# Patient Record
Sex: FEMALE | Race: WHITE | NOT HISPANIC OR LATINO
[De-identification: names, ages, dates, MRNs, and addresses within clinical notes are randomized per-mention and may not be internally consistent; named-entity substitution may affect disease eponyms.]

---

## 2017-10-03 ENCOUNTER — RX RENEWAL (OUTPATIENT)
Age: 47
End: 2017-10-03

## 2017-12-27 ENCOUNTER — APPOINTMENT (OUTPATIENT)
Dept: OBGYN | Facility: CLINIC | Age: 47
End: 2017-12-27
Payer: COMMERCIAL

## 2017-12-27 VITALS
WEIGHT: 156 LBS | BODY MASS INDEX: 26.63 KG/M2 | DIASTOLIC BLOOD PRESSURE: 82 MMHG | HEIGHT: 64 IN | SYSTOLIC BLOOD PRESSURE: 124 MMHG

## 2017-12-27 PROCEDURE — 99396 PREV VISIT EST AGE 40-64: CPT

## 2018-12-28 ENCOUNTER — APPOINTMENT (OUTPATIENT)
Dept: OBGYN | Facility: CLINIC | Age: 48
End: 2018-12-28
Payer: COMMERCIAL

## 2018-12-28 VITALS
DIASTOLIC BLOOD PRESSURE: 72 MMHG | WEIGHT: 156 LBS | BODY MASS INDEX: 26.63 KG/M2 | SYSTOLIC BLOOD PRESSURE: 108 MMHG | HEIGHT: 64 IN

## 2018-12-28 DIAGNOSIS — R32 UNSPECIFIED URINARY INCONTINENCE: ICD-10-CM

## 2018-12-28 PROCEDURE — 99396 PREV VISIT EST AGE 40-64: CPT

## 2018-12-28 RX ORDER — NORETHINDRONE ACETATE AND ETHINYL ESTRADIOL 1.5; 3 MG/1; UG/1
1.5-3 TABLET ORAL DAILY
Qty: 21 | Refills: 11 | Status: DISCONTINUED | COMMUNITY
Start: 2017-12-27 | End: 2018-12-28

## 2019-03-14 ENCOUNTER — APPOINTMENT (OUTPATIENT)
Dept: UROGYNECOLOGY | Facility: CLINIC | Age: 49
End: 2019-03-14
Payer: COMMERCIAL

## 2019-03-14 VITALS
BODY MASS INDEX: 25.61 KG/M2 | DIASTOLIC BLOOD PRESSURE: 67 MMHG | HEIGHT: 64 IN | WEIGHT: 150 LBS | SYSTOLIC BLOOD PRESSURE: 102 MMHG

## 2019-03-14 DIAGNOSIS — Z78.9 OTHER SPECIFIED HEALTH STATUS: ICD-10-CM

## 2019-03-14 DIAGNOSIS — Z82.49 FAMILY HISTORY OF ISCHEMIC HEART DISEASE AND OTHER DISEASES OF THE CIRCULATORY SYSTEM: ICD-10-CM

## 2019-03-14 LAB
BILIRUB UR QL STRIP: NORMAL
CLARITY UR: CLEAR
COLLECTION METHOD: NORMAL
GLUCOSE UR-MCNC: NEGATIVE
HCG UR QL: 1 EU/DL
HGB UR QL STRIP.AUTO: NORMAL
KETONES UR-MCNC: NEGATIVE
LEUKOCYTE ESTERASE UR QL STRIP: NEGATIVE
NITRITE UR QL STRIP: NEGATIVE
PH UR STRIP: 7.5
PROT UR STRIP-MCNC: NEGATIVE
SP GR UR STRIP: 1.01

## 2019-03-14 PROCEDURE — 99244 OFF/OP CNSLTJ NEW/EST MOD 40: CPT

## 2019-03-14 PROCEDURE — 81003 URINALYSIS AUTO W/O SCOPE: CPT | Mod: QW

## 2019-03-14 RX ORDER — SIMVASTATIN 80 MG/1
TABLET, FILM COATED ORAL
Refills: 0 | Status: ACTIVE | COMMUNITY

## 2019-03-14 NOTE — OB HISTORY
[Vaginal ___] : [unfilled] vaginal delivery(s) [Definite ___ (Date)] : the last menstrual period was [unfilled] [Regular Cycle Intervals] : periods have been regular [Last Pap Smear ___] : date of last pap smear was on [unfilled] [Sexually Active] : sexually active [Very Satisfied] : very satisfied

## 2019-03-14 NOTE — HISTORY OF PRESENT ILLNESS
[Cystocele (Obstetric)] : none [Rectal Prolapse] : none [Unable To Restrain Bowel Movement] : frequent [Urinary Frequency More Than Twice At Night (Nocturia)] : none [Urinary Frequency] : none [Feelings Of Urinary Urgency] : none [Pain During Urination (Dysuria)] : none [Urinary Tract Infection] : rare [Constipation Obstructed Defecation] : rare [Stool Visible Blood] : none [Pelvic Pain] : none [Vaginal Pain] : none [] : none [FreeTextEntry1] : \par 47 y/o female presents with mixed incontinence, stress predominant, reports leakage when exercising, progressively worsening and becoming more bothersome, wears pad when exercising, rare leakage prior with urgency prior to getting to the bathroom, denies vaginal bulge or abnormal bleeding, \par \par PMH: denies\par PSH: shoulder surgery\par  X 2

## 2019-03-14 NOTE — PROCEDURE
[FreeTextEntry1] : sterile straight catheterization performed to assess post void residual due to urge incontinence

## 2019-03-14 NOTE — DISCUSSION/SUMMARY
[FreeTextEntry1] : \par Shilpa presents with c/o of mixed urinary incontinence mostly stress urinary incontinence, on exam negative cough stress test. Treatment options for the stress incontinence were discussed and included doing nothing, behavioral modification, Kegel's exercises with and without biofeedback, medications, a pessary or intravaginal devices, periurethral bulking, and surgical correction with a suburethral sling v Pillai v autologous sling. At this time, she is not interested in surgical management. She would like to try pelvic floor exercises and impressa first. IUGA handout on pelvic floor exercises and stress urinary incontinence was given to her. If she decides on surgery she will return for UDS otherwise return in 6 months. I was able to answer all her questions.\par \par [] u/a, culture\par [] return in 6 months for followup or sooner for UDS if desires surgical management\par

## 2019-03-14 NOTE — PHYSICAL EXAM
[No Acute Distress] : in no acute distress [Well developed] : well developed [Well Nourished] : ~L well nourished [Oriented x3] : oriented to person, place, and time [Normal Memory] : ~T memory was ~L unimpaired [Normal Mood/Affect] : mood and affect are normal [No Edema] : ~T edema was not present [None] : no CVA tenderness [Warm and Dry] : was warm and dry to touch [Normal Gait] : gait was normal [Labia Majora] : were normal [Labia Minora] : were normal [Normal Appearance] : general appearance was normal [2] : 2 [Anteversion] : demonstrated anteversion [Soft] :  the cervix was soft [Uterine Adnexae] : were not tender and not enlarged [Normal] : no abnormalities [Post Void Residual ____ml] : post void residual via catheterization was [unfilled] ml [Cough] : no cough [Tenderness] : ~T no ~M abdominal tenderness observed [Distended] : not distended [Inguinal LAD] : no adenopathy was noted in the inguinal lymph nodes [FreeTextEntry3] : neg empty cough stress test [de-identified] : no prolapse

## 2019-03-15 ENCOUNTER — RESULT REVIEW (OUTPATIENT)
Age: 49
End: 2019-03-15

## 2019-03-18 ENCOUNTER — RESULT REVIEW (OUTPATIENT)
Age: 49
End: 2019-03-18

## 2019-03-18 LAB
APPEARANCE: CLEAR
BACTERIA UR CULT: NORMAL
BACTERIA: NEGATIVE
BILIRUBIN URINE: NEGATIVE
BLOOD URINE: NORMAL
COLOR: YELLOW
GLUCOSE QUALITATIVE U: NEGATIVE
HYALINE CASTS: 1 /LPF
KETONES URINE: NEGATIVE
LEUKOCYTE ESTERASE URINE: NEGATIVE
MICROSCOPIC-UA: NORMAL
NITRITE URINE: NEGATIVE
PH URINE: 7.5
PROTEIN URINE: NORMAL
RED BLOOD CELLS URINE: 8 /HPF
SPECIFIC GRAVITY URINE: 1.03
SQUAMOUS EPITHELIAL CELLS: 7 /HPF
UROBILINOGEN URINE: NORMAL
WHITE BLOOD CELLS URINE: 0 /HPF

## 2019-04-11 ENCOUNTER — APPOINTMENT (OUTPATIENT)
Age: 49
End: 2019-04-11
Payer: COMMERCIAL

## 2019-04-11 DIAGNOSIS — N81.89 OTHER FEMALE GENITAL PROLAPSE: ICD-10-CM

## 2019-04-11 DIAGNOSIS — R31.29 OTHER MICROSCOPIC HEMATURIA: ICD-10-CM

## 2019-04-11 DIAGNOSIS — N39.46 MIXED INCONTINENCE: ICD-10-CM

## 2019-04-11 PROCEDURE — 52000 CYSTOURETHROSCOPY: CPT

## 2019-09-12 ENCOUNTER — APPOINTMENT (OUTPATIENT)
Age: 49
End: 2019-09-12

## 2020-01-16 ENCOUNTER — APPOINTMENT (OUTPATIENT)
Dept: OBGYN | Facility: CLINIC | Age: 50
End: 2020-01-16
Payer: COMMERCIAL

## 2020-01-16 VITALS
WEIGHT: 149 LBS | BODY MASS INDEX: 25.44 KG/M2 | HEIGHT: 64 IN | SYSTOLIC BLOOD PRESSURE: 104 MMHG | DIASTOLIC BLOOD PRESSURE: 68 MMHG

## 2020-01-16 PROCEDURE — 99396 PREV VISIT EST AGE 40-64: CPT

## 2020-01-16 NOTE — HISTORY OF PRESENT ILLNESS
[Last Mammogram ___] : Last Mammogram was [unfilled] [Perimenopausal] : is perimenopausal [Last Pap ___] : Last cervical pap smear was [unfilled]

## 2020-01-17 LAB — HPV HIGH+LOW RISK DNA PNL CVX: NOT DETECTED

## 2020-01-23 LAB — CYTOLOGY CVX/VAG DOC THIN PREP: NORMAL

## 2021-01-26 ENCOUNTER — APPOINTMENT (OUTPATIENT)
Dept: OBGYN | Facility: CLINIC | Age: 51
End: 2021-01-26
Payer: COMMERCIAL

## 2021-01-26 VITALS
SYSTOLIC BLOOD PRESSURE: 108 MMHG | BODY MASS INDEX: 25.95 KG/M2 | DIASTOLIC BLOOD PRESSURE: 74 MMHG | TEMPERATURE: 97.7 F | HEIGHT: 64 IN | WEIGHT: 152 LBS

## 2021-01-26 PROCEDURE — 99072 ADDL SUPL MATRL&STAF TM PHE: CPT

## 2021-01-26 PROCEDURE — 99396 PREV VISIT EST AGE 40-64: CPT

## 2021-01-26 NOTE — HISTORY OF PRESENT ILLNESS
[FreeTextEntry1] : no issues  [Patient reported mammogram was normal] : Patient reported mammogram was normal [Patient reported PAP Smear was normal] : Patient reported PAP Smear was normal [Patient reported colonoscopy was normal] : Patient reported colonoscopy was normal [Mammogramdate] : 2020  [PapSmeardate] : 2020 [ColonoscopyDate] : UTD

## 2021-10-10 ENCOUNTER — APPOINTMENT (OUTPATIENT)
Dept: DISASTER EMERGENCY | Facility: CLINIC | Age: 51
End: 2021-10-10

## 2021-10-11 LAB — SARS-COV-2 N GENE NPH QL NAA+PROBE: NOT DETECTED

## 2021-10-13 ENCOUNTER — RESULT REVIEW (OUTPATIENT)
Age: 51
End: 2021-10-13

## 2022-01-31 ENCOUNTER — APPOINTMENT (OUTPATIENT)
Dept: OBGYN | Facility: CLINIC | Age: 52
End: 2022-01-31
Payer: COMMERCIAL

## 2022-01-31 VITALS
WEIGHT: 156 LBS | SYSTOLIC BLOOD PRESSURE: 128 MMHG | BODY MASS INDEX: 26.63 KG/M2 | HEIGHT: 64 IN | DIASTOLIC BLOOD PRESSURE: 82 MMHG

## 2022-01-31 PROCEDURE — 99396 PREV VISIT EST AGE 40-64: CPT

## 2022-01-31 NOTE — HISTORY OF PRESENT ILLNESS
[Patient reported mammogram was normal] : Patient reported mammogram was normal [Patient reported bone density results were normal] : Patient reported bone density results were normal [Patient reported colonoscopy was normal] : Patient reported colonoscopy was normal [FreeTextEntry1] : no issues\par  [Mammogramdate] : 2021 [PapSmeardate] : 2020 [ColonoscopyDate] : UTD

## 2022-02-02 LAB — HPV HIGH+LOW RISK DNA PNL CVX: NOT DETECTED

## 2022-02-03 LAB — CYTOLOGY CVX/VAG DOC THIN PREP: ABNORMAL

## 2022-04-08 ENCOUNTER — RESULT CHARGE (OUTPATIENT)
Age: 52
End: 2022-04-08

## 2022-04-08 ENCOUNTER — APPOINTMENT (OUTPATIENT)
Dept: ORTHOPEDIC SURGERY | Facility: CLINIC | Age: 52
End: 2022-04-08
Payer: COMMERCIAL

## 2022-04-08 VITALS — WEIGHT: 155 LBS | HEIGHT: 64 IN | BODY MASS INDEX: 26.46 KG/M2

## 2022-04-08 PROCEDURE — 99214 OFFICE O/P EST MOD 30 MIN: CPT

## 2022-04-08 PROCEDURE — 73030 X-RAY EXAM OF SHOULDER: CPT | Mod: RT

## 2022-04-08 RX ORDER — PREDNISONE 20 MG/1
20 TABLET ORAL DAILY
Qty: 10 | Refills: 0 | Status: ACTIVE | COMMUNITY
Start: 2022-04-08 | End: 1900-01-01

## 2022-04-08 NOTE — DISCUSSION/SUMMARY
[de-identified] : The patient is s/p right shoulder one anchor rotator cuff repair with subacromial decompression from 10/13/21. The patient has a probable right shoulder rotator cuff tendinitis.\par \par The patient has an acute and complicated injury. \par She has a moderate risk of morbidity secondary to oral steroid use.\par Xrays were independently reviewed.\par \par The diagnosis and treatments were discussed.\par The patient was prescribed oral steroid for pain.\par She will rest from shoulder exercise at this time.\par If not better in 2-3 weeks the patient will call for CT arthrogram to r/o recurrent RCT.

## 2022-04-08 NOTE — HISTORY OF PRESENT ILLNESS
[de-identified] : The patient is s/p 6 months from surgery. She reports recently increasing her gym exercises. The patient denies a single traumatic event that caused her pain. She denies ROM loss but has mild-moderate pain at EROM. The patient reports soreness to the anterolateral shoulder with repetitive motion or attempted weightbearing. She has discomfort at night but none at rest. She can complete all ADLs as tolerated. She has tried mobic without relief.

## 2022-04-08 NOTE — REASON FOR VISIT
[FreeTextEntry2] : The patient is s/p right shoulder one anchor rotator cuff repair with subacromial decompression from 10/13/21.

## 2022-04-08 NOTE — PHYSICAL EXAM
[Normal Coordination] : normal coordination [Normal Sensation] : normal sensation [Normal Mood and Affect] : normal mood and affect [Able to Communicate] : able to communicate [Normal Skin] : normal skin [Well Developed] : well developed [Well Nourished] : well nourished [Peripheral vascular exam is grossly normal] : peripheral vascular exam is grossly normal [Standing] : standing [Mild] : mild [5 ___] : forward flexion 5[unfilled]/5 [5___] : internal rotation 5[unfilled]/5 [Right] : right shoulder [Left] : left shoulder [NL (0-180)] : full active abduction 0-180 degrees [NL (0-70)] : full internal rotation 0-70 degrees [NL (0-90)] : full external rotation 0-90 degrees [] : motor and sensory intact distally [TWNoteComboBox7] : active forward flexion 150 degrees [de-identified] : active abduction 90 degrees [TWNoteComboBox6] : internal rotation L3 [de-identified] : external rotation 60 degrees [FreeTextEntry1] : Single RCR anchor intact without movement when compared to previous films. No GHOA. Adequate SAD. GH joint is reduced.

## 2022-07-13 ENCOUNTER — RESULT REVIEW (OUTPATIENT)
Age: 52
End: 2022-07-13

## 2022-07-21 ENCOUNTER — APPOINTMENT (OUTPATIENT)
Dept: ORTHOPEDIC SURGERY | Facility: CLINIC | Age: 52
End: 2022-07-21

## 2022-07-21 PROCEDURE — 99214 OFFICE O/P EST MOD 30 MIN: CPT

## 2022-07-21 NOTE — DISCUSSION/SUMMARY
[de-identified] : The patient has a right shoulder recurrent rotator cuff tear. The patient is s/p right shoulder one anchor rotator cuff repair with subacromial decompression from 10/13/21. \par \par The patient discussed treatment options. \par She declines PT, SA injection, oral steroid.\par She will use mobic as needed.\par The patient if she wishes to go forward, is indicated for a right shoulder arthroscopic revision rotator cuff repair, revision subacromial decompression, application of bioinductive patch and application of PRP injection. \par She will follow up as needed or call to schedule surgery states above.

## 2022-07-21 NOTE — HISTORY OF PRESENT ILLNESS
[de-identified] : The patient is s/p 9 months from surgery. She is here for CT arthrogram review. The patient has tried mobic and prednisone with relief while taking the medication.  She reports recently increasing her gym exercises. The patient denies a single traumatic event that caused her pain. The patient has increased pain and weakness with overhead and movement or lifting away from the body. Her pain is a 5-6/10 after use. She feels weak with overhead lifting.

## 2022-07-21 NOTE — PHYSICAL EXAM
[Normal Coordination] : normal coordination [Normal Sensation] : normal sensation [Normal Mood and Affect] : normal mood and affect [Able to Communicate] : able to communicate [Normal Skin] : normal skin [Well Developed] : well developed [Well Nourished] : well nourished [Peripheral vascular exam is grossly normal] : peripheral vascular exam is grossly normal [Standing] : standing [Mild] : mild [5 ___] : forward flexion 5[unfilled]/5 [5___] : internal rotation 5[unfilled]/5 [Left] : left shoulder [NL (0-180)] : full active abduction 0-180 degrees [NL (0-70)] : full internal rotation 0-70 degrees [NL (0-90)] : full external rotation 0-90 degrees [Right] : right shoulder [] : negative Tracy [TWNoteComboBox7] : active forward flexion 150 degrees [de-identified] : active abduction 90 degrees [TWNoteComboBox6] : internal rotation L3 [de-identified] : external rotation 60 degrees [FreeTextEntry1] : Single RCR anchor intact without movement when compared to previous films. No GHOA. Adequate SAD. GH joint is reduced.

## 2022-11-01 ENCOUNTER — TRANSCRIPTION ENCOUNTER (OUTPATIENT)
Age: 52
End: 2022-11-01

## 2022-11-10 ENCOUNTER — APPOINTMENT (OUTPATIENT)
Dept: ORTHOPEDIC SURGERY | Facility: CLINIC | Age: 52
End: 2022-11-10

## 2022-11-10 PROCEDURE — 99214 OFFICE O/P EST MOD 30 MIN: CPT

## 2022-11-10 RX ORDER — HYDROMORPHONE HYDROCHLORIDE 4 MG/1
4 TABLET ORAL
Qty: 28 | Refills: 0 | Status: ACTIVE | COMMUNITY
Start: 2022-11-10 | End: 1900-01-01

## 2022-11-10 NOTE — DISCUSSION/SUMMARY
[de-identified] : The patient has a right shoulder recurrent rotator cuff tear. The patient is s/p right shoulder one anchor rotator cuff repair with subacromial decompression from 10/13/21. \par \par The patient has an acute and complicated. \par She has a moderate risk of morbidity secondary to pending surgery and narcotic rx.  \par \par The preoperative, intraoperative, and postoperative courses were discussed. \par Patient will need postoperative icing, bracing, and PT. \par She was prescribed Percocet for postop pain. \par Home exercises were discussed.\par The patient was given and fitted for the brace today.\par The patient will follow up 7-10 days from surgery. \par \par \par

## 2022-11-10 NOTE — HISTORY OF PRESENT ILLNESS
[de-identified] : The patient is here for preoperative visit. She is relatively unchanged. The patient has tried mobic and prednisone with relief while taking the medication.  She reports recently increasing her gym exercises. The patient denies a single traumatic event that caused her pain. The patient has increased pain and weakness with overhead and movement or lifting away from the body. Her pain is a 5-6/10 after use. She feels weak with overhead lifting.

## 2022-11-10 NOTE — PHYSICAL EXAM
[Normal Coordination] : normal coordination [Normal Sensation] : normal sensation [Normal Mood and Affect] : normal mood and affect [Able to Communicate] : able to communicate [Normal Skin] : normal skin [Well Developed] : well developed [Well Nourished] : well nourished [Peripheral vascular exam is grossly normal] : peripheral vascular exam is grossly normal [Standing] : standing [Mild] : mild [5 ___] : forward flexion 5[unfilled]/5 [5___] : internal rotation 5[unfilled]/5 [Left] : left shoulder [NL (0-180)] : full active abduction 0-180 degrees [NL (0-70)] : full internal rotation 0-70 degrees [NL (0-90)] : full external rotation 0-90 degrees [Right] : right shoulder [] : negative Tracy [TWNoteComboBox7] : active forward flexion 150 degrees [de-identified] : active abduction 90 degrees [TWNoteComboBox6] : internal rotation L3 [de-identified] : external rotation 60 degrees [FreeTextEntry1] : Single RCR anchor intact without movement when compared to previous films. No GHOA. Adequate SAD. GH joint is reduced.

## 2022-11-14 ENCOUNTER — RESULT REVIEW (OUTPATIENT)
Age: 52
End: 2022-11-14

## 2022-11-14 ENCOUNTER — APPOINTMENT (OUTPATIENT)
Dept: ORTHOPEDIC SURGERY | Facility: HOSPITAL | Age: 52
End: 2022-11-14

## 2022-11-14 PROCEDURE — 29826 SHO ARTHRS SRG DECOMPRESSION: CPT | Mod: AS,RT

## 2022-11-14 PROCEDURE — 29827 SHO ARTHRS SRG RT8TR CUF RPR: CPT | Mod: RT

## 2022-11-14 PROCEDURE — 29826 SHO ARTHRS SRG DECOMPRESSION: CPT | Mod: RT

## 2022-11-14 PROCEDURE — 29827 SHO ARTHRS SRG RT8TR CUF RPR: CPT | Mod: AS,RT

## 2022-11-23 ENCOUNTER — APPOINTMENT (OUTPATIENT)
Dept: ORTHOPEDIC SURGERY | Facility: CLINIC | Age: 52
End: 2022-11-23

## 2022-11-23 VITALS — HEIGHT: 64 IN | BODY MASS INDEX: 26.46 KG/M2 | WEIGHT: 155 LBS

## 2022-11-23 PROCEDURE — 99024 POSTOP FOLLOW-UP VISIT: CPT

## 2022-11-23 NOTE — DISCUSSION/SUMMARY
[de-identified] : The patient is s/p a right shoulder arthroscopic revision arthroscopic rotator cuff repair, revision subacromial decompression, application of bioinductive patch and application of PRP injection on 11/14/22. \par \par The patient is doing well postop.\par She will wear brace for 5 more weeks.\par The patient will continue with HEP. \par She will use oral AIs as needed.\par She will follow up with Dr. Zavala in 3 weeks. \par She will be an XOA.\par We will start conservative PT at that time.

## 2022-11-23 NOTE — PHYSICAL EXAM
[Normal Coordination] : normal coordination [Normal Sensation] : normal sensation [Normal Mood and Affect] : normal mood and affect [Orientated] : orientated [Able to Communicate] : able to communicate [Normal Skin] : normal skin [Well Developed] : well developed [Well Nourished] : well nourished [Peripheral vascular exam is grossly normal] : peripheral vascular exam is grossly normal [Right] : right shoulder [] : no AC joint tenderness [FreeTextEntry3] : mild swelling and ecchymosis to the anterior shoulder.

## 2022-11-23 NOTE — HISTORY OF PRESENT ILLNESS
[Gradual] : gradual [1] : 2 [Occasional] : occasional [de-identified] : The patient is s/p one week from surgery. She presents in brace and has been compliant. She is doing well overall. She is completing home exercises. The patient denies CP, SOB, fever, chills. She denies discharge or drainage from her incision. The patient has not needed pain medications at all at this time. She denies residual numbness or paresthesias. She denies new trauma.  [FreeTextEntry1] : right shoulder

## 2022-11-23 NOTE — REASON FOR VISIT
[FreeTextEntry2] : The patient is s/p a right shoulder arthroscopic revision arthroscopic rotator cuff repair, revision subacromial decompression, application of bioinductive patch and application of PRP injection on 11/14/22.

## 2022-11-25 ENCOUNTER — APPOINTMENT (OUTPATIENT)
Dept: ORTHOPEDIC SURGERY | Facility: CLINIC | Age: 52
End: 2022-11-25

## 2022-12-15 ENCOUNTER — APPOINTMENT (OUTPATIENT)
Dept: ORTHOPEDIC SURGERY | Facility: CLINIC | Age: 52
End: 2022-12-15

## 2022-12-15 PROCEDURE — 73030 X-RAY EXAM OF SHOULDER: CPT | Mod: RT

## 2022-12-15 PROCEDURE — 99024 POSTOP FOLLOW-UP VISIT: CPT

## 2022-12-15 RX ORDER — MELOXICAM 15 MG/1
15 TABLET ORAL DAILY
Qty: 21 | Refills: 1 | Status: ACTIVE | COMMUNITY
Start: 2022-12-15 | End: 1900-01-01

## 2022-12-15 NOTE — DISCUSSION/SUMMARY
[de-identified] : The patient is s/p a right shoulder arthroscopic revision arthroscopic rotator cuff repair, revision subacromial decompression, application of bioinductive patch and application of PRP injection on 11/14/22.\par \par The patient is progressing well.\par The patient will begin supervised PT.\par The patient will continue use of the brace for 2 additional weeks.\par The patient will continue taking Mobic as needed.\par The patient will follow up in 4 weeks.\par \par We discussed comprehensive treatment plans that included a prescription management involving the use of prescription strength medications. There is a moderate risk of morbidity with further treatment, especially from use of prescription strength medications and possible side effects of these medications which include upset stomach and cardiac/renal issues with long term use were discussed. \par I recommended that the patient follow-up with their medical physician to discuss any significant specific potential issues with long term use such as interactions with current medications or with exacerbation of underlying medical morbidities.\par \par I, Dr. Vignesh Zavala, attest that this documentation was recorded by the scribe, in my presence, and that it accurately reflects the service I personally performed, and the decisions made by me with my edits as appropriate.  \par \par I, Wyatt Valentino, acting as scribe, attest that this documentation has been prepared under the direction and in the presence of Provider Vignesh Zavala MD.\par

## 2022-12-15 NOTE — PHYSICAL EXAM
[Normal Coordination] : normal coordination [Normal Sensation] : normal sensation [Normal Mood and Affect] : normal mood and affect [Orientated] : orientated [Able to Communicate] : able to communicate [Normal Skin] : normal skin [No obvious lymphadenopathy in areas examined] : no obvious lymphadenopathy in areas examined [Well Developed] : well developed [Well Nourished] : well nourished [Peripheral vascular exam is grossly normal] : peripheral vascular exam is grossly normal [No Respiratory Distress] : no respiratory distress [Right] : right shoulder [] : no induration [FreeTextEntry8] : No diffuse tenderness. [FreeTextEntry9] : No assess secondary to recent surgery. [de-identified] : No assess secondary to recent surgery. [FreeTextEntry1] : RCR hardware intact. no high riding humeral head. type 1 acromion.

## 2022-12-15 NOTE — HISTORY OF PRESENT ILLNESS
[de-identified] : The patient is 1 month post op on her right shoulder. The patient states she is doing well in regards to shoulder pain, the shoulder does not hurt. She is having left cervical pain from the sling and now having is muscle spasms and pain in the right biceps. She is not taking any medications for the pain and she is doing home exercises.  There are no complaints of radiating shoulder pain, numbness or paraesthesias.

## 2023-01-19 ENCOUNTER — APPOINTMENT (OUTPATIENT)
Dept: ORTHOPEDIC SURGERY | Facility: CLINIC | Age: 53
End: 2023-01-19
Payer: COMMERCIAL

## 2023-01-19 DIAGNOSIS — M25.511 PAIN IN RIGHT SHOULDER: ICD-10-CM

## 2023-01-19 PROCEDURE — 99024 POSTOP FOLLOW-UP VISIT: CPT

## 2023-01-19 NOTE — HISTORY OF PRESENT ILLNESS
[de-identified] : The patient is here for the right shoulder. She reports occasional soreness but otherwise doing well. Patient has been progressing with supervised PT. She denies new injuries or traumas. Patient denies fevers,chills or drainage. She denies paraesthesias.

## 2023-01-19 NOTE — PHYSICAL EXAM
[Right] : right shoulder [4___] : internal rotation 4[unfilled]/5 [] : motor and sensory intact distally [de-identified] : I, Dr. Vignesh Zavala, attest that this documentation was recorded by the scribe, in my presence, and that it accurately reflects the service I personally performed, and the decisions made by me with my edits as appropriate.  \par \par I, Wyatt Valentino, acting as scribe, attest that this documentation has been prepared under the direction and in the presence of Provider Vignesh Zavala MD.\par  [TWNoteComboBox7] : active forward flexion 110 degrees [de-identified] : active abduction 70 degrees [TWNoteComboBox6] : internal rotation L3 [de-identified] : external rotation 40 degrees

## 2023-01-19 NOTE — DISCUSSION/SUMMARY
[de-identified] : The patient is s/p a right shoulder arthroscopic revision arthroscopic rotator cuff repair, revision subacromial decompression, application of bioinductive patch and application of PRP injection on 11/14/22.\par \par The patient is doing well overall.\par The patient will continue supervised PT for additional 6 weeks.\par The patient will continue taking Mobic as needed.\par She will follow up with  in 6 weeks.\par \par I, Dr. Vignesh Zavala, attest that this documentation was recorded by the scribe, in my presence, and that it accurately reflects the service I personally performed, and the decisions made by me with my edits as appropriate.  \par \par I, Wyatt Valentino, acting as scribe, attest that this documentation has been prepared under the direction and in the presence of Provider Vignesh Zavala MD.\par

## 2023-01-21 ENCOUNTER — NON-APPOINTMENT (OUTPATIENT)
Age: 53
End: 2023-01-21

## 2023-02-01 ENCOUNTER — APPOINTMENT (OUTPATIENT)
Dept: OBGYN | Facility: CLINIC | Age: 53
End: 2023-02-01
Payer: COMMERCIAL

## 2023-02-01 VITALS
HEIGHT: 64 IN | BODY MASS INDEX: 26.8 KG/M2 | DIASTOLIC BLOOD PRESSURE: 76 MMHG | SYSTOLIC BLOOD PRESSURE: 116 MMHG | WEIGHT: 157 LBS

## 2023-02-01 DIAGNOSIS — Z01.818 ENCOUNTER FOR OTHER PREPROCEDURAL EXAMINATION: ICD-10-CM

## 2023-02-01 DIAGNOSIS — Z00.00 ENCOUNTER FOR GENERAL ADULT MEDICAL EXAMINATION W/OUT ABNORMAL FINDINGS: ICD-10-CM

## 2023-02-01 PROCEDURE — 99396 PREV VISIT EST AGE 40-64: CPT

## 2023-02-01 NOTE — HISTORY OF PRESENT ILLNESS
[Patient reported mammogram was normal] : Patient reported mammogram was normal [Patient reported PAP Smear was normal] : Patient reported PAP Smear was normal [FreeTextEntry1] : no problems  [Mammogramdate] : 2022 [ColonoscopyDate] : UTD  [PapSmeardate] : 2022

## 2023-03-02 ENCOUNTER — APPOINTMENT (OUTPATIENT)
Dept: ORTHOPEDIC SURGERY | Facility: CLINIC | Age: 53
End: 2023-03-02

## 2023-03-09 ENCOUNTER — APPOINTMENT (OUTPATIENT)
Dept: ORTHOPEDIC SURGERY | Facility: CLINIC | Age: 53
End: 2023-03-09
Payer: COMMERCIAL

## 2023-03-09 VITALS — BODY MASS INDEX: 26.8 KG/M2 | WEIGHT: 157 LBS | HEIGHT: 64 IN

## 2023-03-09 DIAGNOSIS — E78.00 PURE HYPERCHOLESTEROLEMIA, UNSPECIFIED: ICD-10-CM

## 2023-03-09 PROCEDURE — 99213 OFFICE O/P EST LOW 20 MIN: CPT

## 2023-03-09 NOTE — DISCUSSION/SUMMARY
[de-identified] : (Assessment and Plan)\par \par The patient has made appropriate post-operative progress and agreed to proceed with the plan of care below. All questions were answered. \par \par -Patient will proceed with physical therapy as per the post-operative protocol.\par -Ibuprofen as needed for pain, GI precautions discussed\par -Return to exercise over the next 6-8 weeks. Avoid heavy lifting, overhead and pressing motions. \par -Follow up in 2 months for a clinical check\par

## 2023-03-09 NOTE — HISTORY OF PRESENT ILLNESS
[de-identified] : (Post-Operative Visit)\par \par Patient Details\par -Date of surgery: 11/14/22\par -Type of surgery: s/p right shoulder arthroscopic revision arthroscopic rotator cuff repair, revision subacromial decompression, application of bioinductive patch and application of PRP injection\par -Current pain score: 0/10 rest, 2/10\par \par \par Treatment Details\par -Currently in physical therapy: yes\par -Location of PT: [facility] in [place]\par -Taking pain medication: motrin PRN \par \par patient reports that she is overall doing well. she has been progressing with PT. would like to continue with PT. reports mild soreness over her deltoid yesterday, improved with NSAID.\par \par

## 2023-03-30 ENCOUNTER — APPOINTMENT (OUTPATIENT)
Dept: ORTHOPEDIC SURGERY | Facility: CLINIC | Age: 53
End: 2023-03-30
Payer: COMMERCIAL

## 2023-03-30 VITALS — BODY MASS INDEX: 26.8 KG/M2 | HEIGHT: 64 IN | WEIGHT: 157 LBS

## 2023-03-30 DIAGNOSIS — M22.2X1 PATELLOFEMORAL DISORDERS, RIGHT KNEE: ICD-10-CM

## 2023-03-30 PROCEDURE — 20610 DRAIN/INJ JOINT/BURSA W/O US: CPT | Mod: RT

## 2023-03-30 PROCEDURE — 99214 OFFICE O/P EST MOD 30 MIN: CPT | Mod: 25

## 2023-03-30 PROCEDURE — 73564 X-RAY EXAM KNEE 4 OR MORE: CPT | Mod: RT

## 2023-03-30 NOTE — DISCUSSION/SUMMARY
[de-identified] : (Assessment and Plan)\par \par History, clinical examination and imaging were most consistent with:\par -right knee patellofemoral pain syndrome, possible degenerative medial menical tear\par \par The diagnosis was explained in detail. The potential non-surgical and surgical treatments were reviewed. The relative risks and benefits of each option were considered relative to the patient’s age, activity level, medical history, symptom severity and previously attempted treatments. \par \par -Non-surgical treatment was selected. \par -Patient will proceed with formal PT.\par -Cortisone injection will be performed for symptom relief.\par -Over the counter NSAID as needed for pain. GI precautions discussed.\par -The added clinical utility of an MRI was discussed. The patient deferred further diagnostic testing at this time.\par -Follow up in 6 to 8 weeks if symptoms persist or fail to improve. Consider MRI at that time. \par \par \par (MDM) \par \par Problem Complexity\par -Moderate: chronic illness with exacerbation\par \par Risk\par -Moderate: injection\par \par The patient was advised to consult with their primary medical provider prior to initiation of any new medications to reduce the risk of adverse effects specific to their long-term home medications and medical history. The risk of gastrointestinal irritation and kidney injury specific to long-term NSAID use was discussed.   \par \par (Procedure Note)\par \par Injection location was the:\par -right knee joint\par \par Injection contents were: \par 40 mg of kenalog and 5 mL of 1% lidocaine\par \par Procedure Details: \par -Informed consent was obtained. Discussed possible risks of corticosteroid injection including hyperglycemia, infection and skin discoloration. \par -Discussed that due to infection risk, an interval between injection and any future surgery is 6 weeks for arthroscopy and 3 months for arthroplasty.\par -Injection performed using aseptic technique. Hemostasis was confirmed.\par -Procedure was tolerated well with no complications. \par \par

## 2023-03-30 NOTE — HISTORY OF PRESENT ILLNESS
[de-identified] : Patient age in years is 52\par Occupation \par \par Body part causing symptoms is the right knee\par Symptoms began 6 months ago, no known injury\par Location of pain is over the patella \par Denies mechanical symptoms \par Quality of pain is stabbing\par Pain score at rest is 0\par Pain score during activity is 5/10\par Radicular symptoms are not present\par Prior treatments include compressive knee brace \par Patient's condition is not associated with worker’s compensation, no-fault or interscholastic athletics\par

## 2023-03-30 NOTE — IMAGING
[de-identified] : (Physical Exam: Knee)\par \par Laterality is right \par \par Patient is in no acute distress, alert and oriented\par Sensation is grossly intact to light touch in the foot\par Motor function is 5/5 in the foot\par Capillary refill is less than 2 seconds in the toes\par Lymphadenopathy is not present\par Peripheral edema is not present\par \par Skin is intact \par Effusion is not present \par Atrophy is not present\par Antalgic gait is not present \par \par Medial joint line tenderness is present \par Lateral joint line tenderness is not present \par Patellar tenderness is present \par Calf tenderness is not present \par \par Knee extension is 0\par Knee flexion is 135\par \par Quadriceps strength is 5/5\par Hamstring strength is 5/5\par \par Lachman is normal \par Posterior drawer is normal\par Varus stress stability is normal\par Valgus stress stability is normal\par \par Medial Daniel test is normal\par Lateral Daniel test is normal\par Patellofemoral grind test is abnormal\par Patellar apprehension test is normal\par  [Right] : right knee [All Views] : anteroposterior, lateral, skyline, and anteroposterior standing [Mild patellofemoral OA] : Mild patellofemoral OA

## 2023-05-11 ENCOUNTER — APPOINTMENT (OUTPATIENT)
Dept: ORTHOPEDIC SURGERY | Facility: CLINIC | Age: 53
End: 2023-05-11
Payer: COMMERCIAL

## 2023-05-11 ENCOUNTER — APPOINTMENT (OUTPATIENT)
Dept: ORTHOPEDIC SURGERY | Facility: CLINIC | Age: 53
End: 2023-05-11

## 2023-05-11 VITALS — BODY MASS INDEX: 26.8 KG/M2 | HEIGHT: 64 IN | WEIGHT: 157 LBS

## 2023-05-11 DIAGNOSIS — Z98.890 OTHER SPECIFIED POSTPROCEDURAL STATES: ICD-10-CM

## 2023-05-11 PROCEDURE — 99213 OFFICE O/P EST LOW 20 MIN: CPT

## 2023-05-11 NOTE — DISCUSSION/SUMMARY
[de-identified] : (Assessment and Plan)\par \par The patient has made appropriate post-operative progress and agreed to proceed with the plan of care below. All questions were answered. \par \par -Patient will increase activity at the gym as tolerated. Additional PT is deferred\par -Meloxicam as needed for pain, GI precautions discussed\par -Follow up as needed\par

## 2023-05-11 NOTE — IMAGING
[de-identified] : (Physical Exam: Shoulder)\par \par Laterality is right left\par \par Patient is in no acute distress, alert and oriented\par Sensation is grossly intact to light touch in the hand\par Axillary sensation is intact in the shoulder\par Motor function is 5/5 in the hand\par Deltoid and biceps are firing\par Capillary refill is less than 2 seconds in the fingers\par Lymphadenopathy is not present\par Peripheral edema is not present\par \par Skin is intact \par Incisions are healed\par Swelling is not present \par Atrophy is not present\par Deformity of the biceps is not present \par \par Active forward elevation is 170\par Passive forward elevation is 170\par External rotation at the side is 60\par Internal rotation behind the back is to the level of T12\par \par Forward elevation strength is 5/5\par External rotation strength at the side is 5/5 \par Internal rotation strength at the side is 5/5 \par Deltoid strength of anterior, posterior and lateral heads is 5/5\par

## 2023-05-11 NOTE — HISTORY OF PRESENT ILLNESS
[de-identified] : -Date of surgery: 11/14/22\par -Type of surgery: s/p right shoulder arthroscopic revision arthroscopic rotator cuff repair, revision subacromial decompression, application of bioinductive patch and application of PRP injection\par \par Pain score at rest is 1\par Pain score during activity is 7\par Treatments since last visit include PT which is completed\par Compared to last visit, symptoms are similar\par She has been increasing activity at the gym and has soreness\par She would like a refill for meloxicam\par She reports that her knee is slightly better

## 2023-08-08 ENCOUNTER — RX RENEWAL (OUTPATIENT)
Age: 53
End: 2023-08-08

## 2023-11-09 ENCOUNTER — NON-APPOINTMENT (OUTPATIENT)
Age: 53
End: 2023-11-09

## 2023-11-10 ENCOUNTER — NON-APPOINTMENT (OUTPATIENT)
Age: 53
End: 2023-11-10

## 2023-11-10 RX ORDER — MELOXICAM 15 MG/1
15 TABLET ORAL
Qty: 90 | Refills: 0 | Status: ACTIVE | COMMUNITY
Start: 2023-11-10 | End: 1900-01-01

## 2024-02-05 ENCOUNTER — APPOINTMENT (OUTPATIENT)
Dept: OBGYN | Facility: CLINIC | Age: 54
End: 2024-02-05
Payer: COMMERCIAL

## 2024-02-05 VITALS
HEIGHT: 64 IN | WEIGHT: 146 LBS | BODY MASS INDEX: 24.92 KG/M2 | SYSTOLIC BLOOD PRESSURE: 135 MMHG | DIASTOLIC BLOOD PRESSURE: 77 MMHG

## 2024-02-05 DIAGNOSIS — Z01.419 ENCOUNTER FOR GYNECOLOGICAL EXAMINATION (GENERAL) (ROUTINE) W/OUT ABNORMAL FINDINGS: ICD-10-CM

## 2024-02-05 DIAGNOSIS — M75.101 UNSPECIFIED ROTATOR CUFF TEAR OR RUPTURE OF RIGHT SHOULDER, NOT SPECIFIED AS TRAUMATIC: ICD-10-CM

## 2024-02-05 PROCEDURE — 99396 PREV VISIT EST AGE 40-64: CPT

## 2024-02-05 NOTE — HISTORY OF PRESENT ILLNESS
[Patient reported mammogram was normal] : Patient reported mammogram was normal [Patient reported PAP Smear was normal] : Patient reported PAP Smear was normal [FreeTextEntry1] : no issues  doing well  [Mammogramdate] : 2023  [PapSmeardate] : 2022 [ColonoscopyDate] : UTD

## 2025-02-24 ENCOUNTER — APPOINTMENT (OUTPATIENT)
Dept: OBGYN | Facility: CLINIC | Age: 55
End: 2025-02-24
Payer: COMMERCIAL

## 2025-02-24 ENCOUNTER — NON-APPOINTMENT (OUTPATIENT)
Age: 55
End: 2025-02-24

## 2025-02-24 VITALS — WEIGHT: 153 LBS | SYSTOLIC BLOOD PRESSURE: 115 MMHG | DIASTOLIC BLOOD PRESSURE: 69 MMHG | BODY MASS INDEX: 26.26 KG/M2

## 2025-02-24 DIAGNOSIS — B00.2 HERPESVIRAL GINGIVOSTOMATITIS AND PHARYNGOTONSILLITIS: ICD-10-CM

## 2025-02-24 DIAGNOSIS — Z01.419 ENCOUNTER FOR GYNECOLOGICAL EXAMINATION (GENERAL) (ROUTINE) W/OUT ABNORMAL FINDINGS: ICD-10-CM

## 2025-02-24 PROCEDURE — 99396 PREV VISIT EST AGE 40-64: CPT

## 2025-02-24 RX ORDER — VALACYCLOVIR 500 MG/1
500 TABLET, FILM COATED ORAL
Qty: 90 | Refills: 0 | Status: ACTIVE | COMMUNITY
Start: 2025-02-24 | End: 1900-01-01

## 2025-02-26 LAB — HPV HIGH+LOW RISK DNA PNL CVX: NOT DETECTED

## 2025-02-27 LAB — CYTOLOGY CVX/VAG DOC THIN PREP: ABNORMAL
